# Patient Record
Sex: FEMALE | Race: WHITE | Employment: UNEMPLOYED | URBAN - METROPOLITAN AREA
[De-identification: names, ages, dates, MRNs, and addresses within clinical notes are randomized per-mention and may not be internally consistent; named-entity substitution may affect disease eponyms.]

---

## 2019-07-02 ENCOUNTER — TRANSFERRED RECORDS (OUTPATIENT)
Dept: HEALTH INFORMATION MANAGEMENT | Facility: CLINIC | Age: 54
End: 2019-07-02

## 2019-07-16 ENCOUNTER — TRANSFERRED RECORDS (OUTPATIENT)
Dept: HEALTH INFORMATION MANAGEMENT | Facility: CLINIC | Age: 54
End: 2019-07-16

## 2019-07-16 LAB — POTASSIUM SERPL-SCNC: 3.6 MMOL/L (ref 3.5–5)

## 2019-07-17 ENCOUNTER — TRANSFERRED RECORDS (OUTPATIENT)
Dept: HEALTH INFORMATION MANAGEMENT | Facility: CLINIC | Age: 54
End: 2019-07-17

## 2019-09-20 ENCOUNTER — TRANSFERRED RECORDS (OUTPATIENT)
Dept: HEALTH INFORMATION MANAGEMENT | Facility: CLINIC | Age: 54
End: 2019-09-20

## 2019-09-27 ENCOUNTER — TRANSFERRED RECORDS (OUTPATIENT)
Dept: HEALTH INFORMATION MANAGEMENT | Facility: CLINIC | Age: 54
End: 2019-09-27

## 2019-10-01 ENCOUNTER — TRANSFERRED RECORDS (OUTPATIENT)
Dept: HEALTH INFORMATION MANAGEMENT | Facility: CLINIC | Age: 54
End: 2019-10-01

## 2019-10-04 ENCOUNTER — TRANSFERRED RECORDS (OUTPATIENT)
Dept: HEALTH INFORMATION MANAGEMENT | Facility: CLINIC | Age: 54
End: 2019-10-04

## 2019-11-14 ENCOUNTER — TRANSFERRED RECORDS (OUTPATIENT)
Dept: HEALTH INFORMATION MANAGEMENT | Facility: CLINIC | Age: 54
End: 2019-11-14

## 2020-01-09 ENCOUNTER — TRANSFERRED RECORDS (OUTPATIENT)
Dept: HEALTH INFORMATION MANAGEMENT | Facility: CLINIC | Age: 55
End: 2020-01-09

## 2020-01-20 ENCOUNTER — MEDICAL CORRESPONDENCE (OUTPATIENT)
Dept: HEALTH INFORMATION MANAGEMENT | Facility: CLINIC | Age: 55
End: 2020-01-20

## 2020-07-23 ENCOUNTER — VIRTUAL VISIT (OUTPATIENT)
Dept: RHEUMATOLOGY | Facility: CLINIC | Age: 55
End: 2020-07-23
Payer: COMMERCIAL

## 2020-07-23 DIAGNOSIS — M51.369 LUMBAR DEGENERATIVE DISC DISEASE: ICD-10-CM

## 2020-07-23 DIAGNOSIS — M50.30 DDD (DEGENERATIVE DISC DISEASE), CERVICAL: ICD-10-CM

## 2020-07-23 DIAGNOSIS — M15.9 POLYARTICULAR OSTEOARTHRITIS: Primary | ICD-10-CM

## 2020-07-23 PROCEDURE — 99204 OFFICE O/P NEW MOD 45 MIN: CPT | Mod: GT | Performed by: STUDENT IN AN ORGANIZED HEALTH CARE EDUCATION/TRAINING PROGRAM

## 2020-07-23 RX ORDER — CYCLOBENZAPRINE HCL 10 MG
10 TABLET ORAL
COMMUNITY
Start: 2020-07-17

## 2020-07-23 RX ORDER — TRAMADOL HYDROCHLORIDE 50 MG/1
TABLET ORAL
COMMUNITY
Start: 2020-06-29

## 2020-07-23 RX ORDER — CELECOXIB 200 MG/1
CAPSULE ORAL
COMMUNITY
Start: 2020-04-08

## 2020-07-23 RX ORDER — ESTRADIOL 0.05 MG/D
1 PATCH, EXTENDED RELEASE TRANSDERMAL
COMMUNITY
Start: 2020-06-25

## 2020-07-23 NOTE — PATIENT INSTRUCTIONS
Blood tests ordered    X-rays of bilateral hands ordered    Physical exam is consistent with severe nodular, erosive osteoarthritis.  Likelihood of inflammatory arthritis like RA is less.  But I will repeat x-rays of bilateral hands to look for erosive disease.      For pain relief you can continue see Celebrex and Tylenol as needed basis.    Based on the x-rays will consider hand therapy referral.

## 2020-07-23 NOTE — PROGRESS NOTES
"  Mireille Cabrera is a 54 year old female who is being evaluated via a billable video visit.      The patient has been notified of following:     \"This video visit will be conducted via a call between you and your physician/provider. We have found that certain health care needs can be provided without the need for an in-person physical exam.  This service lets us provide the care you need with a video conversation.  If a prescription is necessary we can send it directly to your pharmacy.  If lab work is needed we can place an order for that and you can then stop by our lab to have the test done at a later time.    Video visits are billed at different rates depending on your insurance coverage.  Please reach out to your insurance provider with any questions.    If during the course of the call the physician/provider feels a video visit is not appropriate, you will not be charged for this service.\"    Patient has given verbal consent for Video visit? Yes  How would you like to obtain your AVS? MyChart  If you are dropped from the video visit, the video invite should be resent to: Mychart   Will anyone else be joining your video visit? Chanda Marin [facility]           Active Problem List:     Patient Active Problem List    Diagnosis Date Noted     Polyarticular osteoarthritis      Priority: Medium     Lumbar degenerative disc disease      Priority: Medium     DDD (degenerative disc disease), cervical      Priority: Medium            History of Present Illness:   Mireille Cabrera is a 54 year old female with PMH of severe polyarticular osteoarthritis, lumbar and cervical degenerative disc disease, status post Princess-en-Y for obesity evaluated via a billable virtual visit in consultation at request of Dr Luz for evaluation of joint pains.    She reports pain in her hands since 2014.  Since that time deformities developed over her fingers and have progressed to the extent that she cannot make a fist, hold things " with her hands and do activities of daily living.  Fingers are very swollen and deformed.  She has seen rheumatologist Dr. Garcia in [facility] for worsening joint pains in her hands before.  X-ray and MRI of her hands has shown severe erosive osteoarthritis.  She was started on Celebrex 200 mg daily.  She underwent left trapeziectomy and suture suspension plasty in July 2019 for left CMC joint arthritis.  Pain and swelling over the left thumb did not improve after surgery and got worse.  MRI of her left hand in September 2019 showed possible fluid collection concerning for abscess.  She underwent left thumb irrigation and debridement and treated with antibiotics.  Pathology however did not show any evidence of osteomyelitis.    She also reports pain in bilateral knees and ankles.  Also has history of lumbar degenerative disc disease status post  Anterior spinal fusion L4-S1, posterior spinal reconstruction L4 S1 and right L5-S1 decompression and allograft in 2007.  Recent CT cervical spine done on 7/11/2020 due to fall showed moderate to severe multilevel cervical spondylosis.    She denies any family history of severe osteoarthritis, rheumatoid arthritis.  She does not have psoriasis, inflammatory bowel disease, dactylitis, plantar fasciitis or heel pain.  No history of uveitis.  Her autoimmune work-up done in 2016 revealed negative rheumatoid factor, anti-CCP, PIA, SSA/SSB, SCL 70.            Review of Systems:     Review Of Systems  Constitutional: denies fever, chills, night sweats and weight loss.  Skin: No skin rash.  Eyes: No dryness or irritation in eyes. No episode of eye inflammation or redness.   Ears/Nose/Throat: no recurrent sinus infections.  Respiratory: No shortness of breath, dyspnea on exertion, cough, or hemoptysis  Cardiovascular: no chest pain or palpitations.  Gastrointestinal: no nausea, vomiting, abdominal pain.  Normal bowel movements.  Genitourinary: no dysuria, frequency  or  hematuria.  Musculoskeletal: as in HPI  Neurologic: + numbness, tingling.  Psychiatric: no mood disorders.  Hematologic/Lymphatic/Immunologic: no history of easy bruising, petechia or purpura.  No abnormal bleeding.   Endocrine: no h/o thyroid disease or Diabetes.                  Past Medical History:     Past Medical History:   Diagnosis Date     DDD (degenerative disc disease), cervical      Lumbar degenerative disc disease      Polyarticular osteoarthritis      Past Surgical History:   Procedure Laterality Date     BACK SURGERY       left thumb trapeziectomy with suture suspension       RAO EN Y BOWEL              Social History:     Social History     Occupational History     Not on file   Tobacco Use     Smoking status: Not on file   Substance and Sexual Activity     Alcohol use: Not on file     Drug use: Not on file     Sexual activity: Not on file            Family History:     Family History   Problem Relation Age of Onset     Autoimmune Disease No family hx of             Allergies:     Allergies   Allergen Reactions     Penicillins             Medications:     Current Outpatient Medications   Medication Sig Dispense Refill     ADDERALL 20 MG OR TABS 1 TABLET DAILY       celecoxib (CELEBREX) 200 MG capsule        cyclobenzaprine (FLEXERIL) 10 MG tablet Take 10 mg by mouth       estradiol (VIVELLE-DOT) 0.05 MG/24HR bi-weekly patch Place 1 patch onto the skin       traMADol (ULTRAM) 50 MG tablet        TYLENOL PO MAX OF 3,000 MG IN 24 HOURS       NORCO 5-325 MG PO TABS 1 TABLET EVERY 4 TO 6 HOURS AS NEEDED FOR SEVERE PAIN       TYLENOL/CODEINE #3 300-30 MG OR TABS 1 TABLET BY MOUTH EVERY 6 HOURS FOR SEVERE HEADACHE              Physical Exam:   Vitals not taken.   Wt Readings from Last 4 Encounters:   No data found for Wt       Constitutional: obese, appearing stated age; cooperative  MS: Severe deformities of fingers.  Heberden nodes over DIP joints, Tiny nodes of the PIP joints.  Bilateral middle  fingers are markedly swollen.  Dorsal subluxation of bilateral thumbs.  Cannot make a complete fist.  No tenderness reported over MCP joints.  Flexion deformities of DIP joints noted.  Psych: nl judgement, orientation, memory, affect.         Data:     No results found for any visits on 07/23/20.    No results for input(s): WBC, RBC, HGB, HCT, MCV, RDW, PLT, CCPABG, ALBUMIN, CRP, BUN, CREAT in the last 94527 hours.    Invalid input(s): MCT,  AST,  ALT,  ESR   No results for input(s): TSH, T4 in the last 81562 hours.  Hemoglobin   Date Value Ref Range Status   08/13/2009 13.3 11.7 - 15.7 g/dL Final     Urea Nitrogen   Date Value Ref Range Status   08/13/2009 14 5 - 24 mg/dL Final     No lab results found.    Invalid input(s): SEDRATE, TBILI, NH3    Outside studies reviewed: Records from [facility] reviewed.    Reviewed Rheumatology lab flowsheet    Assessment    Severe erosive nodular inflammatory hand osteoarthritis  Lumbar and cervical degenerative disc disease  Status post left trapeziectomy -July 2019, status post left thumb irrigation and debridement-October 2019  Obesity status post Princess-en-Y    Severe erosive hand osteoarthritis: She has severe deformities of her fingers typical of osteoarthritis.  She does not have involvement of MCP joints which are spared in osteoarthritis.  Patients with rheumatoid arthritis do not have involvement of DIP joints.  Psoriatic arthritis patients can have severe deformities involving DIP joints but her physical exam shows Heberden nodes, Tiny nodes which are more specific of osteoarthritis rather than psoriatic arthritis.    Unfortunately there is not much evidence based treatment options available for severe inflammatory osteoarthritis.  Low-dose prednisone treatment and Plaquenil has been suggested but randomized control trials have not been able to show any benefit.    To evaluate further I will get x-rays of bilateral hands to rule out erosive disease due to  other causes like RA, gouty arthritis etc.  She denies any history of chronic gout.    If there will be any evidence of inflammatory changes due to RA, further treatment with steroids and Plaquenil can be considered.    Otherwise for severe hand osteoarthritis will refer to hand therapy.    Plan    Blood tests ordered    X-rays of bilateral hands ordered    Physical exam is consistent with severe nodular, erosive osteoarthritis.  Likelihood of inflammatory arthritis like RA is less.  But I will repeat x-rays of bilateral hands to look for erosive disease.      For pain relief you can continue see Celebrex and Tylenol as needed basis.    Based on the x-rays will consider hand therapy referral.      Video-Visit Details    Type of service:  Video Visit    Video Start Time: 2:47 PM  Video End Time:3:15 PM     Originating Location (pt. Location): Home    Distant Location (provider location):  [facility]     Platform used for Video Visit: Romelia Riley MD

## 2020-10-21 ENCOUNTER — ANCILLARY PROCEDURE (OUTPATIENT)
Dept: GENERAL RADIOLOGY | Facility: CLINIC | Age: 55
End: 2020-10-21
Attending: STUDENT IN AN ORGANIZED HEALTH CARE EDUCATION/TRAINING PROGRAM
Payer: COMMERCIAL

## 2020-10-21 DIAGNOSIS — M15.9 POLYARTICULAR OSTEOARTHRITIS: ICD-10-CM

## 2020-10-21 PROCEDURE — 73130 X-RAY EXAM OF HAND: CPT | Mod: TC

## 2020-10-26 DIAGNOSIS — M15.9 POLYARTICULAR OSTEOARTHRITIS: ICD-10-CM

## 2020-10-26 LAB
CRP SERPL-MCNC: <2.9 MG/L (ref 0–8)
ERYTHROCYTE [SEDIMENTATION RATE] IN BLOOD BY WESTERGREN METHOD: 9 MM/H (ref 0–30)

## 2020-10-26 PROCEDURE — 86200 CCP ANTIBODY: CPT | Performed by: STUDENT IN AN ORGANIZED HEALTH CARE EDUCATION/TRAINING PROGRAM

## 2020-10-26 PROCEDURE — 86140 C-REACTIVE PROTEIN: CPT | Performed by: STUDENT IN AN ORGANIZED HEALTH CARE EDUCATION/TRAINING PROGRAM

## 2020-10-26 PROCEDURE — 86038 ANTINUCLEAR ANTIBODIES: CPT | Performed by: STUDENT IN AN ORGANIZED HEALTH CARE EDUCATION/TRAINING PROGRAM

## 2020-10-26 PROCEDURE — 86431 RHEUMATOID FACTOR QUANT: CPT | Performed by: STUDENT IN AN ORGANIZED HEALTH CARE EDUCATION/TRAINING PROGRAM

## 2020-10-26 PROCEDURE — 85652 RBC SED RATE AUTOMATED: CPT | Performed by: STUDENT IN AN ORGANIZED HEALTH CARE EDUCATION/TRAINING PROGRAM

## 2020-10-26 PROCEDURE — 36415 COLL VENOUS BLD VENIPUNCTURE: CPT | Performed by: STUDENT IN AN ORGANIZED HEALTH CARE EDUCATION/TRAINING PROGRAM

## 2020-10-27 LAB
ANA SER QL IF: NEGATIVE
CCP AB SER IA-ACNC: 1 U/ML
RHEUMATOID FACT SER NEPH-ACNC: <7 IU/ML (ref 0–20)

## 2020-11-02 NOTE — RESULT ENCOUNTER NOTE
Kayce Harris,     Blood tests have shown normal rheumatoid factor, anti-CCP which is tests for rheumatoid arthritis.  Inflammation markers are normal.  PIA which is screening test for autoimmune connective tissue diseases like lupus is negative.  Likelihood of rheumatoid arthritis and other autoimmune connective tissue disease is low.    X-rays of bilateral hands have shown severe osteoarthritis.  It has cause bone damage and erosive changes.  No evidence of rheumatoid arthritis was seen on x-rays either.  You do not need to be on immune suppressive medications.  For osteoarthritis, pain medications, hand therapy, steroid injections can work.  I can send you to hand therapy if you would like.    You can use topical creams like Voltaren gel on your hand joints.

## 2020-11-14 ENCOUNTER — MYC MEDICAL ADVICE (OUTPATIENT)
Dept: RHEUMATOLOGY | Facility: CLINIC | Age: 55
End: 2020-11-14

## 2020-11-14 ENCOUNTER — HEALTH MAINTENANCE LETTER (OUTPATIENT)
Age: 55
End: 2020-11-14

## 2020-11-16 NOTE — TELEPHONE ENCOUNTER
Addressed in encounter from 10/28/2020    CÉSAR PatelN, RN   Rheumatology Care Coordinator   [facility]

## 2020-11-18 ENCOUNTER — OFFICE VISIT (OUTPATIENT)
Dept: ORTHOPEDICS | Facility: CLINIC | Age: 55
End: 2020-11-18
Attending: STUDENT IN AN ORGANIZED HEALTH CARE EDUCATION/TRAINING PROGRAM
Payer: COMMERCIAL

## 2020-11-18 ENCOUNTER — ANCILLARY PROCEDURE (OUTPATIENT)
Dept: GENERAL RADIOLOGY | Facility: CLINIC | Age: 55
End: 2020-11-18
Attending: FAMILY MEDICINE
Payer: COMMERCIAL

## 2020-11-18 VITALS
WEIGHT: 192 LBS | HEIGHT: 69 IN | SYSTOLIC BLOOD PRESSURE: 142 MMHG | DIASTOLIC BLOOD PRESSURE: 88 MMHG | BODY MASS INDEX: 28.44 KG/M2

## 2020-11-18 DIAGNOSIS — S93.492A SPRAIN OF ANTERIOR TALOFIBULAR LIGAMENT OF LEFT ANKLE, INITIAL ENCOUNTER: ICD-10-CM

## 2020-11-18 DIAGNOSIS — M15.4 EROSIVE OSTEOARTHRITIS OF BOTH HANDS: Primary | ICD-10-CM

## 2020-11-18 DIAGNOSIS — G89.29 CHRONIC HAND PAIN, UNSPECIFIED LATERALITY: ICD-10-CM

## 2020-11-18 DIAGNOSIS — S99.912A INJURY OF LEFT ANKLE, INITIAL ENCOUNTER: ICD-10-CM

## 2020-11-18 DIAGNOSIS — M79.643 CHRONIC HAND PAIN, UNSPECIFIED LATERALITY: ICD-10-CM

## 2020-11-18 PROCEDURE — 73610 X-RAY EXAM OF ANKLE: CPT | Mod: LT | Performed by: RADIOLOGY

## 2020-11-18 PROCEDURE — 99204 OFFICE O/P NEW MOD 45 MIN: CPT | Performed by: FAMILY MEDICINE

## 2020-11-18 RX ORDER — METHYLPREDNISOLONE 4 MG
TABLET, DOSE PACK ORAL
Qty: 21 TABLET | Refills: 0 | Status: SHIPPED | OUTPATIENT
Start: 2020-11-18

## 2020-11-18 ASSESSMENT — MIFFLIN-ST. JEOR: SCORE: 1525.29

## 2020-11-18 NOTE — PROGRESS NOTES
Mireille Cabrera  :  1965  DOS: 2020  MRN: [id number]    Sports Medicine Clinic Visit    PCP: Kate Luz    Mireille Cabrera is a 55 year old Right hand dominant female who is seen in consultation at the request of  Abdias Riley M.D. presenting with chronic hand pain and weakness.    Injury: Gradual onset of bilateral hand pain and progressing weakness over the past ~ 4+ years.  Pain located over bilateral hands, joints of all fingers - middle fingers are worst, nonradiating.  Additional Features:  Positive: swelling, weakness and deformity.  Symptoms are better with Other medications: celebrex and Rest.  Symptoms are worse with: gripping/grasping, lifting, finger flexion.  Other evaluation and/or treatments so far consists of: Heat, Ibuprofen, Other medications: celebrex, diclofenac gel, Rest and compression gloves.  Recent imaging completed: X-rays completed 10/21/20.  Prior History of related problems: history of status post left thumb CMC joint arthroplasty in 2017.    Second complaint of left ankle inversion injury that occurred after slipping on the snow/ice, yesterday evening (20).  Pain located over lateral ankle.  Moderate swelling and bruising noted.  Pain is worse with full weight bearing, walking, and lateral movement.  No previous treatment completed at this time.  No previous history of injury to left ankle.    Review of Systems  Musculoskeletal: as above  Remainder of review of systems is negative including constitutional, CV, pulmonary, GI, Skin and Neurologic except as noted in HPI or medical history.    Past Medical History:   Diagnosis Date     DDD (degenerative disc disease), cervical      Lumbar degenerative disc disease      Polyarticular osteoarthritis      Past Surgical History:   Procedure Laterality Date     BACK SURGERY       left thumb irrigation and debridement   10/2019     left thumb trapeziectomy with suture suspension  2019     RAO EN Y BOWEL    "    Family History   Problem Relation Age of Onset     Autoimmune Disease No family hx of          Objective  BP (!) 142/88   Ht 1.753 m (5' 9\")   Wt 87.1 kg (192 lb)   BMI 28.35 kg/m        General: healthy, alert and in no distress      HEENT: no scleral icterus or conjunctival erythema     Skin: no suspicious lesions or rash. No jaundice.     CV: regular rhythm by palpation, 2+ distal pulses, no pedal edema      Resp: normal respiratory effort without conversational dyspnea     Psych: normal mood and affect      Gait: nonantalgic, appropriate coordination and balance     Neuro: normal light touch sensory exam of the extremities. Motor strength as noted below       Bilateral Wrist and Hand exam    Inspection:       Swelling, warmth, mild redness over fingers in b/l hands, with prominent dysfiguring IP joint nodes and osteophytes    Tender:       PIP and DIP joint of 1st, 2nd, 3rd, 4th and 5th digit(s)  Bilateral      1st CMC L>R, mildly in b/l radiocarpal joints    Non Tender:       Remainder of the Wrist and Hand bilateral    ROM:       Decreased active and passive ROM of the 1st, 2nd, 3rd, 4th and 5th PIP and DIP with flexion and extension bilateral    Strength:       Motor function intact, but decreased function due to mechanical restrictions    Neurovascular:       2+ radial pulses bilaterally with brisk capillary refill and      normal sensation to light touch in the radial, median and ulnar nerve distributions    Left Ankle Exam:    Inspection:       ecchymosis noted lateral ankle, mild       edema noted lateral ankle and midfoot, mild    Foot inspection:       no deformity noted       moderate lateral ankle swelling and lateral midfoot as well    ROM:        limited by pain passively in IR, plantarflexion, and with resisted eversion and dorsiflexion    Tender:       lateral ankle ligaments       diffusely and mildly in soft tissue about lateral ankle       Mild distal fibula bony TTP    Non-Tender:       " remainder of foot and ankle       lateral malleolus       deltoid ligament       posterior edge of lateral malleolus       proximal 5th metatarsal       dorsal tibiotalar joint       tarsal navicular       medial malleolus    Skin:       well perfused       capillary refill less than 2 seconds    Special Tests:       neg (-) anterior drawer        neg (-) talar tilt        neg (-) external rotation testing for instability, mild pain       Neg antione, calcaneal squueze    Gait:       antalgic gait    Proprioception:        limited ability to maintain single leg stance    Radiology:  Recent Results (from the past 744 hour(s))   XR Hand Bilateral G/E 3 Views    Narrative    HAND BILATERAL THREE OR MORE VIEWS October 21, 2020 2:52 PM     HISTORY: Severe finger deformities, evaluate for erosive disease.  Polyarticular osteoarthritis.      Impression    IMPRESSION: IP joint degenerative changes, relatively symmetrical with  central erosions. This raises the possibility of erosive  osteoarthritis (inflammatory osteoarthritis). Left first  carpometacarpal arthroplasty and trapezium osteotomy is noted.    PEBBLES RODNEY MD   XR Ankle Left G/E 3 Views    Narrative    ANKLE LEFT THREE OR MORE VIEWS   11/18/2020 5:38 PM     HISTORY: Left ankle pain after injury.    COMPARISON: None.      Impression    IMPRESSION: No evidence of acute fracture. Mortise and talar dome are  intact.    JONA LYNNE MD       Assessment:  1. Erosive osteoarthritis of both hands    2. Injury of left ankle, initial encounter    3. Chronic hand pain, unspecified laterality        Plan:  Discussed the assessment with the patient.  Follow up: prn based on clinical progress for hands and left ankle  Bilateral hand pain from significant arthritic change, active inflammation in essentially all finger joints  I agree with prior concerns for an autoimmune process given no traumatic reason or cumulative stress variable to explain multiple joints with this  level of reactivity and disfigurement  Reviewed options for Voltaren gel, arthritis gloves, modified activity  Reviewed option for CSI, but given how many joints are painful, this is not a very efficient approach  Prednisone and plaquenil were discussed with rheum, and logic for avoiding without dx is appreciated  Unfortunately I do not prescribe long term antiinflammatories or immunomodulators, so my approach is somewhat limited for this  Reviewed option of continuing to f/u with Rheum as directed, though I am not sure they feel they have more to offer  However, concern on imaging was for erosive arthritis, and this does seem to be the case to some degree based on visualization of XR as well as radiology read  Offered referral to hand surgeon to discuss, see if sub-specialist has any other options, declined for now, can facilitate in the future if desired  Referral placed for pain mgmt for consultation on alternative approaches to managing this issue  For therapeutic value, one-time Rx was placed for medrol dosepak, to see if inflammation can be calmed for awhile, will assess benefit and keep in touch via MyChart  Try to discharge Celebrex while on steroid, and assess need to return to this, good to take a break if possible given long term use  Acute left ankle sprain, signs of grade II ATFL sprain, XR reassuring, able to bear wt  Reviewed option for use of assistive device, and walking boot vs ankle brace  Trilok brace provided today and felt better while on  Basic HEP and RICE methods reviewed  PT will be available in the future prn  Expect 4-6 week recovery from ankle sprain, should improve steadily over time    Thanks very much for sending this nice lady to us, I will keep you updated with her progress      Davon Foote DO, CAQ  Primary Care Sports Medicine  [facility]       Disclaimer: This note consists of symbols derived from keyboarding, dictation and/or voice recognition software.  As a result, there may be errors in the script that have gone undetected. Please consider this when interpreting information found in this chart.

## 2020-11-18 NOTE — Clinical Note
Kayce Crespo,     Thanks for sending Kimberly, very tough case.  Unfortunately I do not have much to offer her given the severity and diffuse nature of the issue.  She is already on long term NSAIDs which is not ideal.  I did try a medrol dose-emi to try to give her some releif as she is obviously miserable and has limited hand functtion.  I am wondering your thoguhts on the radiologist's interpretation that there ARE erosive changes evident on the XR, and whether that impacts your option to treat her from a rheum perspective, even if there is not diagnostic clarity from your workup.  Appreciate your help very much with her and others!    Best,     Davon Foote DO, CABI  Primary Care Sports Medicine  [facility]

## 2020-11-18 NOTE — LETTER
2020         RE: Mireille Cabrera  [address] [facility] [id number]        Dear Colleague,    Thank you for referring your patient, Mireille Cabrera, to the [facility]. Please see a copy of my visit note below.    Mireille Cabrera  :  1965  DOS: 2020  MRN: [id number]    Sports Medicine Clinic Visit    PCP: Kate Luz    Mireille Cabrera is a 55 year old Right hand dominant female who is seen in consultation at the request of  Abdias Riley M.D. presenting with chronic hand pain and weakness.    Injury: Gradual onset of bilateral hand pain and progressing weakness over the past ~ 4+ years.  Pain located over bilateral hands, joints of all fingers - middle fingers are worst, nonradiating.  Additional Features:  Positive: swelling, weakness and deformity.  Symptoms are better with Other medications: celebrex and Rest.  Symptoms are worse with: gripping/grasping, lifting, finger flexion.  Other evaluation and/or treatments so far consists of: Heat, Ibuprofen, Other medications: celebrex, diclofenac gel, Rest and compression gloves.  Recent imaging completed: X-rays completed 10/21/20.  Prior History of related problems: history of status post left thumb CMC joint arthroplasty in 2017.    Second complaint of left ankle inversion injury that occurred after slipping on the snow/ice, yesterday evening (20).  Pain located over lateral ankle.  Moderate swelling and bruising noted.  Pain is worse with full weight bearing, walking, and lateral movement.  No previous treatment completed at this time.  No previous history of injury to left ankle.    Review of Systems  Musculoskeletal: as above  Remainder of review of systems is negative including constitutional, CV, pulmonary, GI, Skin and Neurologic except as noted in HPI or medical history.    Past Medical History:   Diagnosis Date     DDD (degenerative disc disease), cervical      Lumbar  "degenerative disc disease      Polyarticular osteoarthritis      Past Surgical History:   Procedure Laterality Date     BACK SURGERY       left thumb irrigation and debridement   10/2019     left thumb trapeziectomy with suture suspension  07/2019     RAO EN Y BOWEL       Family History   Problem Relation Age of Onset     Autoimmune Disease No family hx of          Objective  BP (!) 142/88   Ht 1.753 m (5' 9\")   Wt 87.1 kg (192 lb)   BMI 28.35 kg/m        General: healthy, alert and in no distress      HEENT: no scleral icterus or conjunctival erythema     Skin: no suspicious lesions or rash. No jaundice.     CV: regular rhythm by palpation, 2+ distal pulses, no pedal edema      Resp: normal respiratory effort without conversational dyspnea     Psych: normal mood and affect      Gait: nonantalgic, appropriate coordination and balance     Neuro: normal light touch sensory exam of the extremities. Motor strength as noted below       Bilateral Wrist and Hand exam    Inspection:       Swelling, warmth, mild redness over fingers in b/l hands, with prominent dysfiguring IP joint nodes and osteophytes    Tender:       PIP and DIP joint of 1st, 2nd, 3rd, 4th and 5th digit(s)  Bilateral      1st CMC L>R, mildly in b/l radiocarpal joints    Non Tender:       Remainder of the Wrist and Hand bilateral    ROM:       Decreased active and passive ROM of the 1st, 2nd, 3rd, 4th and 5th PIP and DIP with flexion and extension bilateral    Strength:       Motor function intact, but decreased function due to mechanical restrictions    Neurovascular:       2+ radial pulses bilaterally with brisk capillary refill and      normal sensation to light touch in the radial, median and ulnar nerve distributions    Left Ankle Exam:    Inspection:       ecchymosis noted lateral ankle, mild       edema noted lateral ankle and midfoot, mild    Foot inspection:       no deformity noted       moderate lateral ankle swelling and lateral midfoot as " well    ROM:        limited by pain passively in IR, plantarflexion, and with resisted eversion and dorsiflexion    Tender:       lateral ankle ligaments       diffusely and mildly in soft tissue about lateral ankle       Mild distal fibula bony TTP    Non-Tender:       remainder of foot and ankle       lateral malleolus       deltoid ligament       posterior edge of lateral malleolus       proximal 5th metatarsal       dorsal tibiotalar joint       tarsal navicular       medial malleolus    Skin:       well perfused       capillary refill less than 2 seconds    Special Tests:       neg (-) anterior drawer        neg (-) talar tilt        neg (-) external rotation testing for instability, mild pain       Neg antione, calcaneal squueze    Gait:       antalgic gait    Proprioception:        limited ability to maintain single leg stance    Radiology:  Recent Results (from the past 744 hour(s))   XR Hand Bilateral G/E 3 Views    Narrative    HAND BILATERAL THREE OR MORE VIEWS October 21, 2020 2:52 PM     HISTORY: Severe finger deformities, evaluate for erosive disease.  Polyarticular osteoarthritis.      Impression    IMPRESSION: IP joint degenerative changes, relatively symmetrical with  central erosions. This raises the possibility of erosive  osteoarthritis (inflammatory osteoarthritis). Left first  carpometacarpal arthroplasty and trapezium osteotomy is noted.    PEBBLES RODNEY MD   XR Ankle Left G/E 3 Views    Narrative    ANKLE LEFT THREE OR MORE VIEWS   11/18/2020 5:38 PM     HISTORY: Left ankle pain after injury.    COMPARISON: None.      Impression    IMPRESSION: No evidence of acute fracture. Mortise and talar dome are  intact.    JONA LYNNE MD       Assessment:  1. Erosive osteoarthritis of both hands    2. Injury of left ankle, initial encounter    3. Chronic hand pain, unspecified laterality        Plan:  Discussed the assessment with the patient.  Follow up: prn based on clinical progress for hands and  left ankle  Bilateral hand pain from significant arthritic change, active inflammation in essentially all finger joints  I agree with prior concerns for an autoimmune process given no traumatic reason or cumulative stress variable to explain multiple joints with this level of reactivity and disfigurement  Reviewed options for Voltaren gel, arthritis gloves, modified activity  Reviewed option for CSI, but given how many joints are painful, this is not a very efficient approach  Prednisone and plaquenil were discussed with rheum, and logic for avoiding without dx is appreciated  Unfortunately I do not prescribe long term antiinflammatories or immunomodulators, so my approach is somewhat limited for this  Reviewed option of continuing to f/u with Rheum as directed, though I am not sure they feel they have more to offer  However, concern on imaging was for erosive arthritis, and this does seem to be the case to some degree based on visualization of XR as well as radiology read  Offered referral to hand surgeon to discuss, see if sub-specialist has any other options, declined for now, can facilitate in the future if desired  Referral placed for pain mgmt for consultation on alternative approaches to managing this issue  For therapeutic value, one-time Rx was placed for medrol dosepak, to see if inflammation can be calmed for awhile, will assess benefit and keep in touch via MyChart  Try to discharge Celebrex while on steroid, and assess need to return to this, good to take a break if possible given long term use  Acute left ankle sprain, signs of grade II ATFL sprain, XR reassuring, able to bear wt  Reviewed option for use of assistive device, and walking boot vs ankle brace  Trilok brace provided today and felt better while on  Basic HEP and RICE methods reviewed  PT will be available in the future prn  Expect 4-6 week recovery from ankle sprain, should improve steadily over time    Thanks very much for sending this  nice lady to us, I will keep you updated with her progress      Davon Foote DO, CAQ  Primary Care Sports Medicine  [facility]       Disclaimer: This note consists of symbols derived from keyboarding, dictation and/or voice recognition software. As a result, there may be errors in the script that have gone undetected. Please consider this when interpreting information found in this chart.      Again, thank you for allowing me to participate in the care of your patient.        Sincerely,        Davon Foote DO

## 2020-11-20 ENCOUNTER — TELEPHONE (OUTPATIENT)
Dept: PALLIATIVE MEDICINE | Facility: CLINIC | Age: 55
End: 2020-11-20

## 2020-11-20 NOTE — TELEPHONE ENCOUNTER
Pt calling to schedule new Evaluation for [facility]. Pt hung up.    Madhuri HOUSE    [facility]

## 2021-09-12 ENCOUNTER — HEALTH MAINTENANCE LETTER (OUTPATIENT)
Age: 56
End: 2021-09-12

## 2022-01-02 ENCOUNTER — HEALTH MAINTENANCE LETTER (OUTPATIENT)
Age: 57
End: 2022-01-02

## 2022-11-19 ENCOUNTER — HEALTH MAINTENANCE LETTER (OUTPATIENT)
Age: 57
End: 2022-11-19

## 2023-04-09 ENCOUNTER — HEALTH MAINTENANCE LETTER (OUTPATIENT)
Age: 58
End: 2023-04-09